# Patient Record
Sex: FEMALE | Race: WHITE | NOT HISPANIC OR LATINO | Employment: FULL TIME | URBAN - METROPOLITAN AREA
[De-identification: names, ages, dates, MRNs, and addresses within clinical notes are randomized per-mention and may not be internally consistent; named-entity substitution may affect disease eponyms.]

---

## 2022-12-09 ENCOUNTER — TELEPHONE (OUTPATIENT)
Dept: OBSTETRICS AND GYNECOLOGY | Facility: CLINIC | Age: 37
End: 2022-12-09

## 2023-02-23 ENCOUNTER — OFFICE VISIT (OUTPATIENT)
Dept: OBSTETRICS AND GYNECOLOGY | Facility: CLINIC | Age: 38
End: 2023-02-23
Payer: COMMERCIAL

## 2023-02-23 VITALS
HEIGHT: 64 IN | DIASTOLIC BLOOD PRESSURE: 92 MMHG | HEART RATE: 80 BPM | WEIGHT: 293 LBS | BODY MASS INDEX: 50.02 KG/M2 | SYSTOLIC BLOOD PRESSURE: 147 MMHG

## 2023-02-23 DIAGNOSIS — Z30.09 FAMILY PLANNING: Primary | ICD-10-CM

## 2023-02-23 PROCEDURE — 99202 OFFICE O/P NEW SF 15 MIN: CPT | Performed by: OBSTETRICS & GYNECOLOGY

## 2023-02-23 RX ORDER — CETIRIZINE HYDROCHLORIDE 10 MG/1
TABLET ORAL
COMMUNITY

## 2023-02-23 RX ORDER — FERROUS SULFATE 325(65) MG
TABLET ORAL
COMMUNITY

## 2023-02-23 RX ORDER — BUPROPION HYDROCHLORIDE 300 MG/1
1 TABLET ORAL DAILY
COMMUNITY
Start: 2023-01-27

## 2023-02-23 NOTE — PROGRESS NOTES
"GYN Visit    Chief Complaint   Patient presents with   • New GYN     Disc BirthControl        HPI:   37 y.o. LMP: Patient's last menstrual period was 2023.  She is here today to discuss family planning. She has been on combination OCPS for several years for regulation of PCOS but she is now wishing to stop. She just got  and is now [employer]. She does not want to be on any form of contraception. She was counseled that she can stop her pills anytime but with her history of AUB and PCOS this may occur once she stops. She understands and will stop pills. She and her partner are unsure whether they want children. She is sexually active with one male partner and denies any H/O STDS. She denies any h/o ovarian or breast cancer but states her maternal grandmother had breast cancer older than age 50. She denies any complaints today, she denies any vaginal odor or discharge, dysuria or hematuria, F/C, D/C, N/V, Cp or SOB.     Social History     Substance and Sexual Activity   Sexual Activity Yes   • Partners: Male   • Birth control/protection: Birth control pill     History: PMHx, Meds, Allergies, PSHx, Social Hx, and POBHx all reviewed and updated.    PHYSICAL EXAM:  /92   Pulse 80   Ht 162.6 cm (64\")   Wt (!) 151 kg (333 lb 3.2 oz)   LMP 2023   BMI 57.19 kg/m²   General- NAD, alert and oriented, appropriate  Psych- Normal mood, good memory    Neck- No masses, no thyroid enlargement  Cardiovascular- Regular rhythm, no murnurs  Respiratory- CTA to bases, no wheezes  Abdomen- Soft, non distended, non tender, no masses        ASSESSMENT AND PLAN:  Diagnoses and all orders for this visit:    1. Family planning (Primary)      Follow Up:  Return in about 3 months (around 2023) for Annual exam.    I spent 20 minutes caring for Sofya on this date of service. This time includes time spent by me in the following activities:preparing for the visit, reviewing tests, obtaining and/or reviewing a " separately obtained history, performing a medically appropriate examination and/or evaluation  and counseling and educating the patient/family/caregiver    Cele Nina DO  02/23/2023    [facility]  [facility] [place] [address]  Dept: [phone]  Dept Fax: [phone]  Loc: [phone]  Loc Fax: [phone]